# Patient Record
Sex: FEMALE | Race: WHITE | NOT HISPANIC OR LATINO | Employment: FULL TIME | URBAN - METROPOLITAN AREA
[De-identification: names, ages, dates, MRNs, and addresses within clinical notes are randomized per-mention and may not be internally consistent; named-entity substitution may affect disease eponyms.]

---

## 2017-10-09 ENCOUNTER — APPOINTMENT (OUTPATIENT)
Dept: SOCIAL WORK | Facility: CLINIC | Age: 32
End: 2017-10-09

## 2017-10-09 PROCEDURE — 90686 IIV4 VACC NO PRSV 0.5 ML IM: CPT | Performed by: REGISTERED NURSE

## 2018-10-05 ENCOUNTER — IMMUNIZATION (OUTPATIENT)
Dept: SOCIAL WORK | Facility: CLINIC | Age: 33
End: 2018-10-05
Payer: COMMERCIAL

## 2018-10-05 DIAGNOSIS — Z23 NEED FOR VACCINATION: ICD-10-CM

## 2018-10-05 PROCEDURE — 90686 IIV4 VACC NO PRSV 0.5 ML IM: CPT | Performed by: REGISTERED NURSE

## 2018-10-05 PROCEDURE — 90471 IMMUNIZATION ADMIN: CPT | Performed by: REGISTERED NURSE

## 2019-06-28 ENCOUNTER — HOSPITAL ENCOUNTER (EMERGENCY)
Facility: MEDICAL CENTER | Age: 34
End: 2019-06-28
Attending: EMERGENCY MEDICINE
Payer: COMMERCIAL

## 2019-06-28 VITALS
TEMPERATURE: 98.5 F | HEIGHT: 68 IN | OXYGEN SATURATION: 100 % | DIASTOLIC BLOOD PRESSURE: 87 MMHG | BODY MASS INDEX: 25.16 KG/M2 | WEIGHT: 166 LBS | RESPIRATION RATE: 18 BRPM | HEART RATE: 103 BPM | SYSTOLIC BLOOD PRESSURE: 142 MMHG

## 2019-06-28 DIAGNOSIS — F10.920 ACUTE ALCOHOLIC INTOXICATION WITHOUT COMPLICATION (HCC): ICD-10-CM

## 2019-06-28 DIAGNOSIS — F41.9 ANXIETY: ICD-10-CM

## 2019-06-28 DIAGNOSIS — F43.23 ADJUSTMENT DISORDER WITH MIXED ANXIETY AND DEPRESSED MOOD: ICD-10-CM

## 2019-06-28 DIAGNOSIS — R45.851 SUICIDAL IDEATION: ICD-10-CM

## 2019-06-28 DIAGNOSIS — E86.0 MILD DEHYDRATION: ICD-10-CM

## 2019-06-28 DIAGNOSIS — F43.21 SITUATIONAL DEPRESSION: ICD-10-CM

## 2019-06-28 LAB
ALBUMIN SERPL BCP-MCNC: 4.8 G/DL (ref 3.2–4.9)
ALBUMIN/GLOB SERPL: 2.1 G/DL
ALP SERPL-CCNC: 35 U/L (ref 30–99)
ALT SERPL-CCNC: 22 U/L (ref 2–50)
AMPHETAMINES UR QL SCN: NEGATIVE
ANION GAP SERPL CALC-SCNC: 10 MMOL/L (ref 0–11.9)
APAP SERPL-MCNC: <10 UG/ML (ref 10–30)
AST SERPL-CCNC: 26 U/L (ref 12–45)
BARBITURATES UR QL SCN: NEGATIVE
BASOPHILS # BLD AUTO: 0.7 % (ref 0–1.8)
BASOPHILS # BLD: 0.04 K/UL (ref 0–0.12)
BENZODIAZ UR QL SCN: NEGATIVE
BILIRUB SERPL-MCNC: 0.4 MG/DL (ref 0.1–1.5)
BUN SERPL-MCNC: 14 MG/DL (ref 8–22)
CALCIUM SERPL-MCNC: 9.3 MG/DL (ref 8.4–10.2)
CHLORIDE SERPL-SCNC: 107 MMOL/L (ref 96–112)
CO2 SERPL-SCNC: 23 MMOL/L (ref 20–33)
COCAINE UR QL SCN: NEGATIVE
CREAT SERPL-MCNC: 1.17 MG/DL (ref 0.5–1.4)
EOSINOPHIL # BLD AUTO: 0.03 K/UL (ref 0–0.51)
EOSINOPHIL NFR BLD: 0.5 % (ref 0–6.9)
ERYTHROCYTE [DISTWIDTH] IN BLOOD BY AUTOMATED COUNT: 44 FL (ref 35.9–50)
ETHANOL BLD-MCNC: 0.06 G/DL
ETHANOL BLD-MCNC: 0.15 G/DL
GLOBULIN SER CALC-MCNC: 2.3 G/DL (ref 1.9–3.5)
GLUCOSE SERPL-MCNC: 99 MG/DL (ref 65–99)
HCG SERPL QL: NEGATIVE
HCT VFR BLD AUTO: 46.5 % (ref 37–47)
HGB BLD-MCNC: 15.6 G/DL (ref 12–16)
IMM GRANULOCYTES # BLD AUTO: 0.02 K/UL (ref 0–0.11)
IMM GRANULOCYTES NFR BLD AUTO: 0.3 % (ref 0–0.9)
LYMPHOCYTES # BLD AUTO: 1.83 K/UL (ref 1–4.8)
LYMPHOCYTES NFR BLD: 29.9 % (ref 22–41)
MCH RBC QN AUTO: 31 PG (ref 27–33)
MCHC RBC AUTO-ENTMCNC: 33.5 G/DL (ref 33.6–35)
MCV RBC AUTO: 92.3 FL (ref 81.4–97.8)
MONOCYTES # BLD AUTO: 0.29 K/UL (ref 0–0.85)
MONOCYTES NFR BLD AUTO: 4.7 % (ref 0–13.4)
NEUTROPHILS # BLD AUTO: 3.92 K/UL (ref 2–7.15)
NEUTROPHILS NFR BLD: 63.9 % (ref 44–72)
NRBC # BLD AUTO: 0 K/UL
NRBC BLD-RTO: 0 /100 WBC
OPIATES UR QL SCN: NEGATIVE
PCP UR QL SCN: NEGATIVE
PLATELET # BLD AUTO: 231 K/UL (ref 164–446)
PMV BLD AUTO: 10.7 FL (ref 9–12.9)
POC BREATHALIZER: 0.06 PERCENT (ref 0–0.01)
POC BREATHALIZER: 0.09 PERCENT (ref 0–0.01)
POTASSIUM SERPL-SCNC: 3.7 MMOL/L (ref 3.6–5.5)
PROT SERPL-MCNC: 7.1 G/DL (ref 6–8.2)
RBC # BLD AUTO: 5.04 M/UL (ref 4.2–5.4)
SALICYLATES SERPL-MCNC: <4 MG/DL (ref 15–25)
SODIUM SERPL-SCNC: 140 MMOL/L (ref 135–145)
THC UR QL SCN: NEGATIVE
WBC # BLD AUTO: 6.1 K/UL (ref 4.8–10.8)

## 2019-06-28 PROCEDURE — 36415 COLL VENOUS BLD VENIPUNCTURE: CPT

## 2019-06-28 PROCEDURE — 99285 EMERGENCY DEPT VISIT HI MDM: CPT

## 2019-06-28 PROCEDURE — 90792 PSYCH DIAG EVAL W/MED SRVCS: CPT | Performed by: PSYCHIATRY & NEUROLOGY

## 2019-06-28 PROCEDURE — 80305 DRUG TEST PRSMV DIR OPT OBS: CPT

## 2019-06-28 PROCEDURE — 84703 CHORIONIC GONADOTROPIN ASSAY: CPT

## 2019-06-28 PROCEDURE — 85025 COMPLETE CBC W/AUTO DIFF WBC: CPT

## 2019-06-28 PROCEDURE — 80053 COMPREHEN METABOLIC PANEL: CPT

## 2019-06-28 PROCEDURE — 80307 DRUG TEST PRSMV CHEM ANLYZR: CPT

## 2019-06-28 PROCEDURE — 302970 POC BREATHALIZER: Performed by: EMERGENCY MEDICINE

## 2019-06-28 RX ORDER — PHENDIMETRAZINE TARTRATE 35 MG/1
1 TABLET ORAL 3 TIMES DAILY
COMMUNITY

## 2019-06-28 RX ORDER — SPIRONOLACTONE 50 MG/1
50 TABLET, FILM COATED ORAL 2 TIMES DAILY
COMMUNITY

## 2019-06-28 ASSESSMENT — PAIN SCALES - WONG BAKER: WONGBAKER_NUMERICALRESPONSE: DOESN'T HURT AT ALL

## 2019-06-28 ASSESSMENT — LIFESTYLE VARIABLES
DOES PATIENT WANT TO STOP DRINKING: NO
DO YOU DRINK ALCOHOL: YES
HAVE YOU EVER FELT YOU SHOULD CUT DOWN ON YOUR DRINKING: YES
EVER FELT BAD OR GUILTY ABOUT YOUR DRINKING: YES
TOTAL SCORE: 2
AVERAGE NUMBER OF DAYS PER WEEK YOU HAVE A DRINK CONTAINING ALCOHOL: 3
TOTAL SCORE: 2
EVER HAD A DRINK FIRST THING IN THE MORNING TO STEADY YOUR NERVES TO GET RID OF A HANGOVER: NO
CONSUMPTION TOTAL: INCOMPLETE
ON A TYPICAL DAY WHEN YOU DRINK ALCOHOL HOW MANY DRINKS DO YOU HAVE: 4
TOTAL SCORE: 2
HAVE PEOPLE ANNOYED YOU BY CRITICIZING YOUR DRINKING: NO

## 2019-06-28 NOTE — ED PROVIDER NOTES
"ED Provider Note    ED Provider Note    Scribed for Johnna Fofana MD by Johnna Fofana. 6/28/2019, 3:14 AM.    Primary care provider: No primary care provider on file.  Means of arrival: EMS  History obtained from: Patient and EMS and nursing  History limited by: None    CHIEF COMPLAINT  Chief Complaint   Patient presents with   • Suicidal Ideation     SI and ETOH       HPI  Doreen Mullen is a 34 y.o. female who presents to the Emergency Department for evaluation of suicidal ideation.  Patient does have a long history of depression, noting she has had occasional passive suicidal ideation since she was in eighth grade.  She notes at that point a previous suicide attempt, that was 20 years previous.  Patient relates increasing thoughts of depression after she had an end of her relationship for the last several days.  She admits these were well controlled but she began drinking heavily today, noting 9-10 \"shots\" of whiskey.  She notes she then wrote a suicide note, medics were contacted and she was transported to the emergency department.  She relates no actual plan to harm herself, she notes she does not have a firearm or any other weapons around the home.  No suicide attempts beyond when she was in eighth grade, about 20 years ago.  She denies any other substance abuse no other coingestions this evening.  No actual attempt, denies any overdose, denies any self-harm behavior.  She does relate she was feeling depressed and \"was seeking attention\" with regard to writing a suicide note.  She does not follow with a psychiatrist, she is not on any antidepressants, she takes Spironolactone for her acne and did not overdose on this medication either.    REVIEW OF SYSTEMS  Pertinent positives include depression, anxiety, attention seeking behavior, alcohol abuse, chronic passive suicidal ideation. Pertinent negatives include no plan to harm self, no attempted self injurious behavior, no other coingestions " "beyond alcohol.  All other systems reviewed and negative.    PAST MEDICAL HISTORY   has a past medical history of Anxiety and Depression.Acne    SURGICAL HISTORY  patient denies any surgical history    SOCIAL HISTORY  Social History   Substance Use Topics   • Smoking status: Never Smoker   • Smokeless tobacco: Never Used   • Alcohol use Yes      Comment: Current 6/28/19      History   Drug Use No     Comment: Denies       FAMILY HISTORY  No family history on file.    CURRENT MEDICATIONS  Home Medications    **Home medications have not yet been reviewed for this encounter**         ALLERGIES  No Known Allergies    PHYSICAL EXAM  VITAL SIGNS: /97   Pulse (!) 104   Temp 36.6 °C (97.8 °F) (Temporal)   Resp 18   Ht 1.737 m (5' 8.4\")   Wt 75.3 kg (166 lb)   SpO2 99%   BMI 24.95 kg/m²     General: Alert, no acute distress, well-kept, nontoxic.  Skin: Warm, dry, normal for ethnicity  Head: Normocephalic, atraumatic  Neck: Trachea midline, no tenderness  Eye: PERRL, normal conjunctiva  ENMT: Oral mucosa moist, no pharyngeal erythema or exudate  Cardiovascular: Regular rate and rhythm, No murmur, Normal peripheral perfusion.  No peripheral edema.  Respiratory: Lungs CTA, respirations are non-labored, breath sounds are equal  Gastrointestinal: Soft, nontender, non distended  Musculoskeletal: No swelling, no deformity  Neurological: Alert and oriented to person, place, time, and situation  Lymphatics: No lymphadenopathy  Psychiatric: Cooperative, intoxicated and minimally anxious but otherwise appropriate mood & affect.  Denies any active suicidal ideation, denies any plan to harm self or anyone else.  Calm and cooperative, not responding to internal stimuli, affect is mood congruent, speech is normal rate and rhythm not pressured.  Thoughts are well organized and linear, judgment is normal.      DIAGNOSTIC STUDIES/PROCEDURES    LABS  Results for orders placed or performed during the hospital encounter of 06/28/19 "   Blood Alcohol   Result Value Ref Range    Diagnostic Alcohol 0.15 (H) 0.00 g/dL   CBC WITH DIFFERENTIAL   Result Value Ref Range    WBC 6.1 4.8 - 10.8 K/uL    RBC 5.04 4.20 - 5.40 M/uL    Hemoglobin 15.6 12.0 - 16.0 g/dL    Hematocrit 46.5 37.0 - 47.0 %    MCV 92.3 81.4 - 97.8 fL    MCH 31.0 27.0 - 33.0 pg    MCHC 33.5 (L) 33.6 - 35.0 g/dL    RDW 44.0 35.9 - 50.0 fL    Platelet Count 231 164 - 446 K/uL    MPV 10.7 9.0 - 12.9 fL    Neutrophils-Polys 63.90 44.00 - 72.00 %    Lymphocytes 29.90 22.00 - 41.00 %    Monocytes 4.70 0.00 - 13.40 %    Eosinophils 0.50 0.00 - 6.90 %    Basophils 0.70 0.00 - 1.80 %    Immature Granulocytes 0.30 0.00 - 0.90 %    Nucleated RBC 0.00 /100 WBC    Neutrophils (Absolute) 3.92 2.00 - 7.15 K/uL    Lymphs (Absolute) 1.83 1.00 - 4.80 K/uL    Monos (Absolute) 0.29 0.00 - 0.85 K/uL    Eos (Absolute) 0.03 0.00 - 0.51 K/uL    Baso (Absolute) 0.04 0.00 - 0.12 K/uL    Immature Granulocytes (abs) 0.02 0.00 - 0.11 K/uL    NRBC (Absolute) 0.00 K/uL   COMP METABOLIC PANEL   Result Value Ref Range    Sodium 140 135 - 145 mmol/L    Potassium 3.7 3.6 - 5.5 mmol/L    Chloride 107 96 - 112 mmol/L    Co2 23 20 - 33 mmol/L    Anion Gap 10.0 0.0 - 11.9    Glucose 99 65 - 99 mg/dL    Bun 14 8 - 22 mg/dL    Creatinine 1.17 0.50 - 1.40 mg/dL    Calcium 9.3 8.4 - 10.2 mg/dL    AST(SGOT) 26 12 - 45 U/L    ALT(SGPT) 22 2 - 50 U/L    Alkaline Phosphatase 35 30 - 99 U/L    Total Bilirubin 0.4 0.1 - 1.5 mg/dL    Albumin 4.8 3.2 - 4.9 g/dL    Total Protein 7.1 6.0 - 8.2 g/dL    Globulin 2.3 1.9 - 3.5 g/dL    A-G Ratio 2.1 g/dL   Acetaminophen Level   Result Value Ref Range    Acetaminophen -Tylenol <10 10 - 30 ug/mL   SALICYLATE LEVEL   Result Value Ref Range    Salicylates, Quant. <4 (L) 15 - 25 mg/dL   HCG QUAL SERUM   Result Value Ref Range    Beta-Hcg Qualitative Serum Negative Negative   ESTIMATED GFR   Result Value Ref Range    GFR If African American >60 >60 mL/min/1.73 m 2    GFR If Non  53  "(A) >60 mL/min/1.73 m 2   UR DRUG SCREEN([facility] ONLY)   Result Value Ref Range    Phencyclidine -Pcp Negative Negative    Benzodiazepines Negative Negative    Cocaine Metabolite Negative Negative    Amphetamines By Triage Negative Negative    Urine THC Negative Negative    Codeine-Morphine Negative Negative    Barbiturates Negative Negative   POC BREATHALIZER   Result Value Ref Range    POC Breathalizer 0.09 (A) 0.00 - 0.01 Percent   POC BREATHALIZER   Result Value Ref Range    POC Breathalizer 0.06 (A) 0.00 - 0.01 Percent     All labs reviewed by me, significant alcohol intoxication, mildly depressed GFR consistent with dehydration.        COURSE & MEDICAL DECISION MAKING  Pertinent Labs & Imaging studies reviewed. (See chart for details)    3:14 AM - Patient seen and examined at bedside.  Ordered toxicologic work-up including drug screen and alcohol level to evaluate her symptoms. The differential diagnoses include but are not limited to: Depression, alcohol intoxication, dehydration, electrolyte abnormality    0340: Serum alcohol level is significantly higher than the breathalyzed alcohol.  Per pharmacokinetics of ethanol anticipate below 0.08 approximately 7 AM.  Other than intoxication, she is medically cleared.  Mild dehydration noted, I have ordered p.o. Hydration.    0544: Point-of-care alcohol is within normal limits but serum level is required.  Patient has contracted for safety with me here in the ED, anticipate discharge, she will be assessed by life skills when clinically sober.    Patient Vitals for the past 24 hrs:   BP Temp Temp src Pulse Resp SpO2 Height Weight   06/28/19 0255 149/97 36.6 °C (97.8 °F) Temporal (!) 104 18 99 % - -   06/28/19 0253 - - - - - - 1.737 m (5' 8.4\") 75.3 kg (166 lb)       Decision Making:  This is a 34 y.o. year old female who presents with having written a suicide note while she is intoxicated.  She denies any active suicidal ideation, she has no plan to harm herself.  " She does have a history of passive suicidal ideation for over 2 decades, does not currently take any medications, she had no recent attempts on her life and has had no self-injurious behavior nor overdoses beyond alcohol use this evening.  Patient observed in emergency department until clinically sober, on reassessment she again notes no suicidal ideation.  She is not a candidate for legal hold, she is not psychotic, she is appropriate, patient is amenable to outpatient follow-up.    The patient will return for new or worsening symptoms and is stable at the time of discharge.    Patient has had high blood pressure while in the emergency department, felt likely secondary to medical condition. Counseled patient to monitor blood pressure at home and follow up with primary care physician.     DISPOSITION:  Patient will be discharged home in stable condition.    FOLLOW UP:  Livier Valdez M.D.  [address] [facility] [address]  [phone]    Schedule an appointment as soon as possible for a visit in 2 days      Ray Garrison M.D.  [id number]  [address]  [facility] [address]  [phone]    Schedule an appointment as soon as possible for a visit in 2 weeks        OUTPATIENT MEDICATIONS:  New Prescriptions    No medications on file         FINAL IMPRESSION  1. Acute alcoholic intoxication without complication (HCC)    2. Anxiety    3. Situational depression    4. Mild dehydration          Johnna ANN (Kacie), am scribing for, and in the presence of, Johnna Fofana MD.    Electronically signed by: Johnna Fofana (Kacie), 6/28/2019    Johnna ANN MD personally performed the services described in this documentation, as scribed by Johnna Fofana in my presence, and it is both accurate and complete    The note accurately reflects work and decisions made by me.  Johnna Fofana  6/28/2019  5:47 AM

## 2019-06-28 NOTE — ED NOTES
NAD noted. PT ambulatory to restroom for UA. UA walked to lab by RN. Pt denies need. SI precautions remain in place.

## 2019-06-28 NOTE — ED NOTES
NAD noted. Pt low risk per scale. Close obs. Close to nurses station. Curtain open and room remains secure. Pt belongings at nurses station. Belongings checked by security.

## 2019-06-28 NOTE — ED TRIAGE NOTES
Pt in per EMS for alcohol use and leaving suicide note for boyfriend. Pt states she does not want to hurt herself or others and just wanted attention. Arrives ambulatory with ease. NAD noted. A/O x4. RR even and nonlabored. Skin W/D. VSS.  Denies suicidal or homicidal ideations. States she has hx of depression and anxiety. Takes spirolactone for acne. Calm and cooperative at this time.   PT states she had been drinking heavily and in a fight with her boyfriend. Pt states she left a suicide note and told her boyfriend she was leaving with his gun. Pt did not take gun. Boyfriend called 911. Pt voluntarily came in to ER. Pt has sitter. Room remains secure. Aware of care plan. All belongings behind nurses station.

## 2019-06-28 NOTE — ED NOTES
Pt on close obs and in view of nurses station. Staff aware of pt and no visitors sign on door. Pt aware of need for UA. Denies need. NAD noted. Pt resting with warm blankets

## 2019-06-28 NOTE — ED NOTES
Pt left home medications in in-pt pharmacy (receipt #[id number]). Spoke with pharm Mobile [facility]; meds held until pt retrieves them. Left message for pt to retrieve medications.

## 2019-06-28 NOTE — ED NOTES
NAD noted. Pt drinking water in paper cup. In view of nurses station. Aware of blood draw around 0700 for recheck on ETOH level. Walking in room with ease

## 2019-06-28 NOTE — DISCHARGE PLANNING
LINDY attempted to set this patient up with an appointment with [facility] out patient.  SW was informed that she would need to come in for a walk-in assessment and then they would schedule her from there.  SW provided resource list to patient bedside and updated her on [facility] procedures.  SW updated ERP and Dr. Alejandro.

## 2019-06-28 NOTE — DISCHARGE INSTRUCTIONS
Return to emergency department of your choice if you develop worsening thoughts of self-harm, plans to take your own life or harm others, or any concerns at all.  Thank you, and have a pleasant day.

## 2019-06-28 NOTE — ED PROVIDER NOTES
She care was signed out from nighttime ER P.  Patient here awaiting evaluation by the alert team or by psychiatry for complaint of suicidal ideations.  Patient has no plan at this time.  She was intoxicated.  Will await sobriety and then evaluation again psychiatry or the alert team.    9:42AM Dr. Hector, psychiatrist, in the department to evaluate the patient.    11:04 AM, psychiatry note reviewed.  Legal hold is been discontinued.  Patient still slightly tachycardic.  She is otherwise feeling well and eager to go home.  Feel this is a safe disposition.  She is been given her belongings.  To be discharged home in stable condition.

## 2019-06-28 NOTE — ED NOTES
NAD noted. Remains secure. Fall precautions in place. Denies need. Reassessment when alcohol level is lower.

## 2019-06-28 NOTE — PSYCHIATRY
"PSYCHIATRIC CONSULTATION:  Reason for admission:   si  Reason for consult: si   Requesting Physician:Lana Salamanca     Legal status:  On hold     Chief Complaint: \"I'm having a breakup\"    HPI:   Doreen Mullen is a 34 y.o. year old female with a PMH of depression who presented to [facility] complaining of suicidal ideation. She was drinking ETOH and left note for her boyfriend stating she was leaving with his gun after they had a fight. She didn't take the gun. He calls 911.  She takes spironolactone for acne. She has been on it for years. She states in relationships she becomes codependent. She states they need to break up and she knows it, and will be doing that this time \"for good\". She states she did this \"for attention. I absolutely know I did. It was the thought in my head. I wanted him to be upset and I wanted him to be mad, and I wanted him to have a reaction. I have a big problem with this.\" Had long conversation with her about distress tolerance. She seems to have a big problems with this specifically related to relationships. She reports she feels abandoned very easily. She states outside of this, her mood is good. She states she is only \"up and down around the relationship stuff\". She states she wants to be in therapy to help get better at this. She does drink. She states it is binge drinking on the weekends. She reports the drinking always makes it worse. She did not attempt. No one had to stop her from taking pills, taking gun, etc. She didn't do anything that was actually harmful to herself, she did threaten.     Review of Systems:  Review of Systems   Constitutional: Negative for chills, fever and weight loss.   HENT: Negative for hearing loss and sore throat.    Eyes: Negative for blurred vision, double vision, discharge and redness.   Respiratory: Negative for cough, wheezing and stridor.    Cardiovascular: Positive for palpitations (.tachycardia). Negative for chest pain.   Gastrointestinal: Negative " "for blood in stool, heartburn, nausea and vomiting.   Genitourinary: Negative for dysuria, flank pain and urgency.   Musculoskeletal: Negative for myalgias and neck pain.   Skin: Negative for itching and rash.   Neurological: Negative for dizziness, weakness and headaches.   Endo/Heme/Allergies: Negative for polydipsia. Does not bruise/bleed easily.   Psychiatric/Behavioral: Positive for substance abuse. Negative for depression, memory loss and suicidal ideas. The patient is nervous/anxious. The patient does not have insomnia.        Psychiatric Examination: observed phenomenon:  Vitals: /97   Pulse (!) 104   Temp 36.6 °C (97.8 °F) (Temporal)   Resp 18   Ht 1.737 m (5' 8.4\")   Wt 75.3 kg (166 lb)   SpO2 99%   BMI 24.95 kg/m²  Body mass index is 24.95 kg/m².      Appearance: grooming wnl   Muscle Strength/Tone: no psychomotor agitation or retardation   Gait/Statiownl n:   Speech: Nl tone, rate, and volume. Not pressured. Understandable.   Thought Process:  Logical and sequential, goal-directed   Associations:no loose associations   Abnormal/Psychotic Thoughts (ex): No a/vh, no evidence of delusions, no ideas of reference, no internal stimulation noted   Insight/Judgement:fair   Orientation:Grossly intact.   Memory:Grossly intact.   Attention/Concentration:Grossly intact.   Language:fluent   Fund of Knowledge:adequate   Mood:          \"okay\"   Affect:         Full and congruent   SI/HI:   Denies   Neurological Testing:( ie clock, cube drawing, MMSE, MOCA,etc.)       Past Psychiatric Hx:   No previous suicide attempts.   No admissions  Has a history of needing counseling  hxo f depression   Hx of \"codependent relationships\"     Family Psychiatric Hx:  Denies   Social Hx:  Social History     Social History   • Marital status: Single     Spouse name: N/A   • Number of children: N/A   • Years of education: N/A     Occupational History   • Not on file.     Social History Main Topics   • Smoking status: Never " Smoker   • Smokeless tobacco: Never Used   • Alcohol use Yes      Comment: Current 6/28/19   • Drug use: No      Comment: Denies   • Sexual activity: Not on file     Other Topics Concern   • Not on file     Social History Narrative   • No narrative on file     Is going to live with her mom. She is happy about this setup.     Drug/Alcohol/Tobacco Hx:   Drugs:denies    Alcohol:+   Tobacco:none     Medical Hx: labs, MARS, medications, etc were reviewed. Only those findings of potential interest to psychiatry are noted below:  :  Past Medical History:   Diagnosis Date   • Anxiety    • Depression      No past surgical history on file.    Allergies:   No Known Allergies    Medications:  No current facility-administered medications for this encounter.      Current Outpatient Prescriptions   Medication Sig Dispense Refill   • Phendimetrazine Tartrate 35 MG Tab Take 1 Tab by mouth 3 times a day.     • PARAGARD INTRAUTERINE COPPER IU 1 Dose by Intrauterine route Continuous.     • spironolactone (ALDACTONE) 50 MG Tab Take 50 mg by mouth 2 times a day. ACNE         Labs/ Testing:  Recent Results (from the past 48 hour(s))   POC BREATHALIZER    Collection Time: 06/28/19  2:44 AM   Result Value Ref Range    POC Breathalizer 0.09 (A) 0.00 - 0.01 Percent   UR DRUG SCREEN([facility] ONLY)    Collection Time: 06/28/19  3:00 AM   Result Value Ref Range    Phencyclidine -Pcp Negative Negative    Benzodiazepines Negative Negative    Cocaine Metabolite Negative Negative    Amphetamines By Triage Negative Negative    Urine THC Negative Negative    Codeine-Morphine Negative Negative    Barbiturates Negative Negative   Blood Alcohol    Collection Time: 06/28/19  3:06 AM   Result Value Ref Range    Diagnostic Alcohol 0.15 (H) 0.00 g/dL   CBC WITH DIFFERENTIAL    Collection Time: 06/28/19  3:06 AM   Result Value Ref Range    WBC 6.1 4.8 - 10.8 K/uL    RBC 5.04 4.20 - 5.40 M/uL    Hemoglobin 15.6 12.0 - 16.0 g/dL    Hematocrit 46.5 37.0 - 47.0  %    MCV 92.3 81.4 - 97.8 fL    MCH 31.0 27.0 - 33.0 pg    MCHC 33.5 (L) 33.6 - 35.0 g/dL    RDW 44.0 35.9 - 50.0 fL    Platelet Count 231 164 - 446 K/uL    MPV 10.7 9.0 - 12.9 fL    Neutrophils-Polys 63.90 44.00 - 72.00 %    Lymphocytes 29.90 22.00 - 41.00 %    Monocytes 4.70 0.00 - 13.40 %    Eosinophils 0.50 0.00 - 6.90 %    Basophils 0.70 0.00 - 1.80 %    Immature Granulocytes 0.30 0.00 - 0.90 %    Nucleated RBC 0.00 /100 WBC    Neutrophils (Absolute) 3.92 2.00 - 7.15 K/uL    Lymphs (Absolute) 1.83 1.00 - 4.80 K/uL    Monos (Absolute) 0.29 0.00 - 0.85 K/uL    Eos (Absolute) 0.03 0.00 - 0.51 K/uL    Baso (Absolute) 0.04 0.00 - 0.12 K/uL    Immature Granulocytes (abs) 0.02 0.00 - 0.11 K/uL    NRBC (Absolute) 0.00 K/uL   COMP METABOLIC PANEL    Collection Time: 06/28/19  3:06 AM   Result Value Ref Range    Sodium 140 135 - 145 mmol/L    Potassium 3.7 3.6 - 5.5 mmol/L    Chloride 107 96 - 112 mmol/L    Co2 23 20 - 33 mmol/L    Anion Gap 10.0 0.0 - 11.9    Glucose 99 65 - 99 mg/dL    Bun 14 8 - 22 mg/dL    Creatinine 1.17 0.50 - 1.40 mg/dL    Calcium 9.3 8.4 - 10.2 mg/dL    AST(SGOT) 26 12 - 45 U/L    ALT(SGPT) 22 2 - 50 U/L    Alkaline Phosphatase 35 30 - 99 U/L    Total Bilirubin 0.4 0.1 - 1.5 mg/dL    Albumin 4.8 3.2 - 4.9 g/dL    Total Protein 7.1 6.0 - 8.2 g/dL    Globulin 2.3 1.9 - 3.5 g/dL    A-G Ratio 2.1 g/dL   Acetaminophen Level    Collection Time: 06/28/19  3:06 AM   Result Value Ref Range    Acetaminophen -Tylenol <10 10 - 30 ug/mL   SALICYLATE LEVEL    Collection Time: 06/28/19  3:06 AM   Result Value Ref Range    Salicylates, Quant. <4 (L) 15 - 25 mg/dL   HCG QUAL SERUM    Collection Time: 06/28/19  3:06 AM   Result Value Ref Range    Beta-Hcg Qualitative Serum Negative Negative   ESTIMATED GFR    Collection Time: 06/28/19  3:06 AM   Result Value Ref Range    GFR If African American >60 >60 mL/min/1.73 m 2    GFR If Non  53 (A) >60 mL/min/1.73 m 2   POC BREATHALIZER    Collection Time:  06/28/19  4:52 AM   Result Value Ref Range    POC Breathalizer 0.06 (A) 0.00 - 0.01 Percent   DIAGNOSTIC ALCOHOL    Collection Time: 06/28/19  7:30 AM   Result Value Ref Range    Diagnostic Alcohol 0.06 (H) 0.00 g/dL       No orders to display         ASSESSMENT:   -Assessment:  Adjustment disorder with mixed anxiety and depression   Alcohol abuse  Borderline traits    PLAN:  Legal status: D/c hold   F/u with [facility] if possible per pt request   Recommend Moustapha Busby LCSW as therapist.     Signing off   Thank you for the consult.

## 2019-06-28 NOTE — CONSULTS
ALERT team BH note:  Psychiatric/BH evaluation completed by [facility] ED psychiatrist, Dr. Alejandro, with legal hold to be DC'd and pt to DC to self  [employer] insurance plan

## 2019-06-28 NOTE — PSYCHIATRY
BRIEF PSYCHIATRIC CONSULT NOTE: patient seen, full note to follow.  -Legal Hold:dc'd     -Assessment:  Adjustment disorder with mixed anxiety and depression   Alcohol abuse  Borderline traits    -Plan:signing off      D/c hold   F/u with [facility] if possible per pt request   Recommend Moustapha Busby LCSW as therapist.     Thank you for the consult.

## 2019-07-04 ASSESSMENT — ENCOUNTER SYMPTOMS
STRIDOR: 0
MYALGIAS: 0
DIZZINESS: 0
HEADACHES: 0
WHEEZING: 0
DEPRESSION: 0
BLOOD IN STOOL: 0
BLURRED VISION: 0
INSOMNIA: 0
NAUSEA: 0
SORE THROAT: 0
BRUISES/BLEEDS EASILY: 0
MEMORY LOSS: 0
VOMITING: 0
POLYDIPSIA: 0
HEARTBURN: 0
EYE DISCHARGE: 0
WEIGHT LOSS: 0
COUGH: 0
EYE REDNESS: 0
FLANK PAIN: 0
WEAKNESS: 0
DOUBLE VISION: 0
PALPITATIONS: 1
NECK PAIN: 0
NERVOUS/ANXIOUS: 1
FEVER: 0
CHILLS: 0

## 2019-07-04 ASSESSMENT — LIFESTYLE VARIABLES: SUBSTANCE_ABUSE: 1

## 2019-10-07 ENCOUNTER — IMMUNIZATION (OUTPATIENT)
Dept: SOCIAL WORK | Facility: CLINIC | Age: 34
End: 2019-10-07
Payer: COMMERCIAL

## 2019-10-07 DIAGNOSIS — Z23 NEED FOR VACCINATION: ICD-10-CM

## 2019-10-07 PROCEDURE — 90686 IIV4 VACC NO PRSV 0.5 ML IM: CPT | Performed by: REGISTERED NURSE

## 2019-10-07 PROCEDURE — 90471 IMMUNIZATION ADMIN: CPT | Performed by: REGISTERED NURSE

## 2020-10-26 ENCOUNTER — IMMUNIZATION (OUTPATIENT)
Dept: SOCIAL WORK | Facility: CLINIC | Age: 35
End: 2020-10-26
Payer: COMMERCIAL

## 2020-10-26 DIAGNOSIS — Z23 NEED FOR VACCINATION: ICD-10-CM

## 2020-10-26 PROCEDURE — 90686 IIV4 VACC NO PRSV 0.5 ML IM: CPT | Performed by: REGISTERED NURSE

## 2020-10-26 PROCEDURE — 90471 IMMUNIZATION ADMIN: CPT | Performed by: REGISTERED NURSE

## 2023-04-20 PROBLEM — S83.241A TEAR OF MEDIAL MENISCUS OF RIGHT KNEE, CURRENT: Status: ACTIVE | Noted: 2023-04-20

## 2023-04-20 PROBLEM — M25.561 ACUTE PAIN OF RIGHT KNEE: Status: ACTIVE | Noted: 2023-04-20

## 2023-04-28 PROBLEM — S83.511A RUPTURE OF ANTERIOR CRUCIATE LIGAMENT OF RIGHT KNEE: Status: ACTIVE | Noted: 2023-04-28

## 2023-05-16 PROBLEM — S83.519A ACL (ANTERIOR CRUCIATE LIGAMENT) RUPTURE: Status: ACTIVE | Noted: 2023-05-03
